# Patient Record
Sex: MALE | Race: WHITE | NOT HISPANIC OR LATINO | Employment: STUDENT | URBAN - METROPOLITAN AREA
[De-identification: names, ages, dates, MRNs, and addresses within clinical notes are randomized per-mention and may not be internally consistent; named-entity substitution may affect disease eponyms.]

---
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
## 2020-01-11 ENCOUNTER — LAB VISIT (OUTPATIENT)
Dept: LAB | Facility: HOSPITAL | Age: 20
End: 2020-01-11
Attending: INTERNAL MEDICINE
Payer: MEDICAID

## 2020-01-11 DIAGNOSIS — E53.8 VITAMIN B12 DEFICIENCY: ICD-10-CM

## 2020-01-11 DIAGNOSIS — E55.9 VITAMIN D DEFICIENCY DISEASE: ICD-10-CM

## 2020-01-11 DIAGNOSIS — E88.819 INSULIN RESISTANCE: ICD-10-CM

## 2020-01-11 LAB
25(OH)D3+25(OH)D2 SERPL-MCNC: 25 NG/ML (ref 30–96)
ALBUMIN SERPL BCP-MCNC: 3.2 G/DL (ref 3.5–5.2)
ALP SERPL-CCNC: 84 U/L (ref 55–135)
ALT SERPL W/O P-5'-P-CCNC: 19 U/L (ref 10–44)
ANION GAP SERPL CALC-SCNC: 8 MMOL/L (ref 8–16)
AST SERPL-CCNC: 18 U/L (ref 10–40)
BASOPHILS # BLD AUTO: 0.09 K/UL (ref 0–0.2)
BASOPHILS NFR BLD: 1.1 % (ref 0–1.9)
BILIRUB SERPL-MCNC: 0.5 MG/DL (ref 0.1–1)
BUN SERPL-MCNC: 16 MG/DL (ref 6–20)
CALCIUM SERPL-MCNC: 8.7 MG/DL (ref 8.7–10.5)
CHLORIDE SERPL-SCNC: 105 MMOL/L (ref 95–110)
CHOLEST SERPL-MCNC: 136 MG/DL (ref 120–199)
CHOLEST/HDLC SERPL: 3.2 {RATIO} (ref 2–5)
CO2 SERPL-SCNC: 26 MMOL/L (ref 23–29)
CREAT SERPL-MCNC: 0.8 MG/DL (ref 0.5–1.4)
DIFFERENTIAL METHOD: ABNORMAL
EOSINOPHIL # BLD AUTO: 0.1 K/UL (ref 0–0.5)
EOSINOPHIL NFR BLD: 0.8 % (ref 0–8)
ERYTHROCYTE [DISTWIDTH] IN BLOOD BY AUTOMATED COUNT: 16 % (ref 11.5–14.5)
EST. GFR  (AFRICAN AMERICAN): >60 ML/MIN/1.73 M^2
EST. GFR  (NON AFRICAN AMERICAN): >60 ML/MIN/1.73 M^2
ESTIMATED AVG GLUCOSE: 108 MG/DL (ref 68–131)
GLUCOSE SERPL-MCNC: 102 MG/DL (ref 70–110)
HBA1C MFR BLD HPLC: 5.4 % (ref 4–5.6)
HCT VFR BLD AUTO: 38.6 % (ref 40–54)
HDLC SERPL-MCNC: 42 MG/DL (ref 40–75)
HDLC SERPL: 30.9 % (ref 20–50)
HGB BLD-MCNC: 12.6 G/DL (ref 14–18)
LDLC SERPL CALC-MCNC: 83.8 MG/DL (ref 63–159)
LYMPHOCYTES # BLD AUTO: 1.6 K/UL (ref 1–4.8)
LYMPHOCYTES NFR BLD: 20.3 % (ref 18–48)
MCH RBC QN AUTO: 29.7 PG (ref 27–31)
MCHC RBC AUTO-ENTMCNC: 32.6 G/DL (ref 32–36)
MCV RBC AUTO: 91 FL (ref 82–98)
MONOCYTES # BLD AUTO: 0.7 K/UL (ref 0.3–1)
MONOCYTES NFR BLD: 8.9 % (ref 4–15)
NEUTROPHILS # BLD AUTO: 5.4 K/UL (ref 1.8–7.7)
NEUTROPHILS NFR BLD: 68.9 % (ref 38–73)
NONHDLC SERPL-MCNC: 94 MG/DL
PLATELET # BLD AUTO: 398 K/UL (ref 150–350)
PMV BLD AUTO: 9.7 FL (ref 9.2–12.9)
POTASSIUM SERPL-SCNC: 4.1 MMOL/L (ref 3.5–5.1)
PROT SERPL-MCNC: 7.5 G/DL (ref 6–8.4)
RBC # BLD AUTO: 4.24 M/UL (ref 4.6–6.2)
SODIUM SERPL-SCNC: 139 MMOL/L (ref 136–145)
TRIGL SERPL-MCNC: 51 MG/DL (ref 30–150)
TSH SERPL DL<=0.005 MIU/L-ACNC: 2.55 UIU/ML (ref 0.4–4)
VIT B12 SERPL-MCNC: 560 PG/ML (ref 210–950)
WBC # BLD AUTO: 7.85 K/UL (ref 3.9–12.7)

## 2020-01-11 PROCEDURE — 84443 ASSAY THYROID STIM HORMONE: CPT

## 2020-01-11 PROCEDURE — 83036 HEMOGLOBIN GLYCOSYLATED A1C: CPT

## 2020-01-11 PROCEDURE — 36415 COLL VENOUS BLD VENIPUNCTURE: CPT

## 2020-01-11 PROCEDURE — 80061 LIPID PANEL: CPT

## 2020-01-11 PROCEDURE — 80053 COMPREHEN METABOLIC PANEL: CPT

## 2020-01-11 PROCEDURE — 82306 VITAMIN D 25 HYDROXY: CPT

## 2020-01-11 PROCEDURE — 83525 ASSAY OF INSULIN: CPT

## 2020-01-11 PROCEDURE — 82607 VITAMIN B-12: CPT

## 2020-01-11 PROCEDURE — 85025 COMPLETE CBC W/AUTO DIFF WBC: CPT

## 2020-01-13 ENCOUNTER — PATIENT MESSAGE (OUTPATIENT)
Dept: INTERNAL MEDICINE | Facility: CLINIC | Age: 20
End: 2020-01-13

## 2020-01-13 DIAGNOSIS — F98.8 ATTENTION DEFICIT DISORDER, UNSPECIFIED HYPERACTIVITY PRESENCE: ICD-10-CM

## 2020-01-13 LAB
INSULIN COLLECTION INTERVAL: 2
INSULIN SERPL-ACNC: 16.4 UU/ML

## 2020-01-13 RX ORDER — METHYLPHENIDATE HYDROCHLORIDE 54 MG/1
TABLET ORAL
Qty: 30 TABLET | Refills: 0 | Status: SHIPPED | OUTPATIENT
Start: 2020-01-13

## 2020-01-13 RX ORDER — METHYLPHENIDATE HYDROCHLORIDE 27 MG/1
27 TABLET ORAL EVERY MORNING
Qty: 30 TABLET | Refills: 0 | Status: CANCELLED | OUTPATIENT
Start: 2020-01-13

## 2020-01-16 ENCOUNTER — PATIENT MESSAGE (OUTPATIENT)
Dept: INTERNAL MEDICINE | Facility: CLINIC | Age: 20
End: 2020-01-16

## 2020-01-16 ENCOUNTER — TELEPHONE (OUTPATIENT)
Dept: INTERNAL MEDICINE | Facility: CLINIC | Age: 20
End: 2020-01-16

## 2020-01-17 ENCOUNTER — TELEPHONE (OUTPATIENT)
Dept: INTERNAL MEDICINE | Facility: CLINIC | Age: 20
End: 2020-01-17

## 2020-01-17 NOTE — TELEPHONE ENCOUNTER
----- Message from Laura Huntley sent at 1/17/2020  9:47 AM CST -----  Contact: Nils(mother) [phone]  Pt mother is calling about picking up the 90L paperwork. Pt mother states she has a noon meeting and needs to  the paperwork before. Pt mother also states the paperwork can be emailed to her [email or web address]  Please call and advise.

## 2020-01-21 ENCOUNTER — OFFICE VISIT (OUTPATIENT)
Dept: INTERNAL MEDICINE | Facility: CLINIC | Age: 20
End: 2020-01-21
Payer: MEDICAID

## 2020-01-21 VITALS
DIASTOLIC BLOOD PRESSURE: 86 MMHG | HEART RATE: 122 BPM | BODY MASS INDEX: 54.23 KG/M2 | OXYGEN SATURATION: 99 % | SYSTOLIC BLOOD PRESSURE: 128 MMHG | HEIGHT: 60 IN | WEIGHT: 276.25 LBS | TEMPERATURE: 98 F

## 2020-01-21 DIAGNOSIS — Z00.00 ROUTINE GENERAL MEDICAL EXAMINATION AT A HEALTH CARE FACILITY: Primary | ICD-10-CM

## 2020-01-21 PROCEDURE — 99213 OFFICE O/P EST LOW 20 MIN: CPT | Mod: PBBFAC | Performed by: INTERNAL MEDICINE

## 2020-01-21 PROCEDURE — 99999 PR PBB SHADOW E&M-EST. PATIENT-LVL III: ICD-10-PCS | Mod: PBBFAC,,, | Performed by: INTERNAL MEDICINE

## 2020-01-21 PROCEDURE — 99395 PR PREVENTIVE VISIT,EST,18-39: ICD-10-PCS | Mod: S$PBB,,, | Performed by: INTERNAL MEDICINE

## 2020-01-21 PROCEDURE — 99999 PR PBB SHADOW E&M-EST. PATIENT-LVL III: CPT | Mod: PBBFAC,,, | Performed by: INTERNAL MEDICINE

## 2020-01-21 PROCEDURE — 99395 PREV VISIT EST AGE 18-39: CPT | Mod: S$PBB,,, | Performed by: INTERNAL MEDICINE

## 2020-01-21 NOTE — PROGRESS NOTES
"Chief Complaint: Follow up of mood disorder, adhd, morbid obesity, possible sleep apnea.      HPI:this is a 19 year old man who presents for follow up of above with his mother, Emely and father. All history is obtained from his mother and father due to his mental handicapped.    Since our last visit, he and his whole family got the flu.  He has gotten over the illness.  His parents have not been working on diet with him.  For the last several days, he has only been taking Concerta 54 mg daily (trying without the 27 mg dose).  His mother reports that his behavior and mood are the same without the 27 mg dose.   She states the only change is that he "eats constantly".  Upon further questioning, he will only eat certain foods he likes (salty chips). He will not eat random food out of the refrigerator or cabinet.  His mother reports that he focuses will with Concerta 54 mg daily - he will color, play X box, writes words in the dictonary.     She states he only has anxiety when it is raining.  He has lots of separation issues with his mother.  He sleeps well - goes to bed at 9 pm with his mother and gets up at 4 am.  He sleeps under a weighted blanket which helps his mood. He has been on Adderal which caused him to cry in the past.      He does not sleep unless his mother is next to him. If his mother goes out, he will wait for her to come home to go to bed.  She has not been out of town in 5 years. When she went out of town, her  slept next to him. He has separation issues from his mother. His mother states that he will not eat or drink until his mother comes home.  When his mother comes home, his mother states he acts as if he is starving. He can follow rules at school. He does not eat inappropriately at school. He refuses to eat home cooked meals. He will only eat [employer] fried chicken.       She notes that he breaths through is mouth at night. No snoring or stops breathing.  She notes that he is more tired. "  He only gets exercise at [facility]. He is obese with a BMI of 55.  Weight is higher by 4 pounds since our last visit.      He has anxiety during severe weather - he is crying and shakes.   His mother gives him Xanax 1 mg which helps him calm down. He does not take it very often.  He asks for the medication.     He has a ruptured left ear drum. The left ear drains. He has some hearing loss.  No hearing aids. He takes zyrtec 10 mg nightly. He will have gagging and vomiting from post nasal drip.     He takes vitamin B12 and vitamin D daily.           Past Medical History:   Diagnosis Date    ADHD (attention deficit hyperactivity disorder)      Down syndrome      HEARING LOSS       deaf until 7m.o. Ear doctor visit 3/2013    Jaundice       at birth    Obesity      Pneumonia       at the age of 2 yrs and again 11/2012    Strabismus      Vision abnormalities              Past Surgical History:   Procedure Laterality Date    ADENOIDECTOMY   2002    EXAMINATION UNDER ANESTHESIA Bilateral 1/4/2019     Procedure: Exam under anesthesia;  Surgeon: Harrison Robb MD;  Location: [address];  Service: ENT;  Laterality: Bilateral;    TONSILLECTOMY   2004    TYMPANOSTOMY TUBE PLACEMENT   2002&2004      Social History            Socioeconomic History    Marital status: Single       Spouse name: Not on file    Number of children: Not on file    Years of education: Not on file    Highest education level: Not on file   Occupational History    Not on file   Social Needs    Financial resource strain: Not on file    Food insecurity:       Worry: Not on file       Inability: Not on file    Transportation needs:       Medical: Not on file       Non-medical: Not on file   Tobacco Use    Smoking status: Passive Smoke Exposure - Never Smoker    Smokeless tobacco: Never Used    Tobacco comment: parents both smoke outside   Substance and Sexual Activity    Alcohol use: No    Drug use: No    Sexual activity:  "Not on file   Lifestyle    Physical activity:       Days per week: Not on file       Minutes per session: Not on file    Stress: Not on file   Relationships    Social connections:       Talks on phone: Not on file       Gets together: Not on file       Attends Muslim service: Not on file       Active member of club or organization: Not on file       Attends meetings of clubs or organizations: Not on file       Relationship status: Not on file   Other Topics Concern    Not on file   Social History Narrative     Lives with both parents and brother and sister     Attends [employer]         REVIEW OF SYSTEMS:No apparent fevers, chills, night sweats, fatigue, visual change,  sore throat, chest pain, shortness of breath, nausea, vomiting, constipation, diarrhea, dysuria, hematuria, polydipsia, polyuria, joint pain, muscle pain, headaches. Wears glasses.         Meds and allergies: updated on EPIC           Physical exam:  /86 (BP Location: Right arm, Patient Position: Sitting, BP Method: Large (Manual))   Pulse (!) 122   Temp 98.2 °F (36.8 °C) (Oral)   Ht 4' 11" (1.499 m)   Wt 125.3 kg (276 lb 3.8 oz)   SpO2 99%   BMI 55.79 kg/m²     General: alert, oriented x 3, no apparent distress.  Affect normal  HEENT: Conjunctivae: anicteric, PERRL, EOMI, TM clear, Oralpharynx clear  Neck: supple, no thyroid enlargement, no cervical lymphadenopathy  Resp: effort normal, lungs clear bilaterally  CV: Regular rate and rhythm without murmurs, gallops or rubs, no lower extremity edema,   Abdomen: soft, non-distended, non-tender, bowel sounds present, no hepatosplenomegaly.    Labs 1/11/20     Assessment/Plan:    Down's Syndrome with adhd, anxiety and separation issues from his mother- very long discussion with his mother regarding setting limits. He was very ugly and manipulative with his mother in the office. Suspect that he is having behavioral issues from his mother not setting limits.  Long " discussion regarding setting limits.  Parents do NOT want SSRI for anxiety. Continue Concerta 54 mg only once daily for now.   Set limits with food.     Morbid obesity - long discussion with parents regarding his weight - risk of sleep apnea, and death from morbid obesity.  Long discussion regarding diet and setting limits with his diet.     Chronic otitis externa- resolved     Mild anemia -labs were drawn right after he got the flu - will repeat at next visit    I will see him back in 3 weeks sooner if problems arise    Spent greater than 40 minutes with the patient and family , greater than 50% in face to face counseling.

## 2020-01-24 ENCOUNTER — PATIENT OUTREACH (OUTPATIENT)
Dept: ADMINISTRATIVE | Facility: HOSPITAL | Age: 20
End: 2020-01-24

## 2020-02-19 ENCOUNTER — PATIENT MESSAGE (OUTPATIENT)
Dept: INTERNAL MEDICINE | Facility: CLINIC | Age: 20
End: 2020-02-19

## 2020-02-19 DIAGNOSIS — F98.8 ATTENTION DEFICIT DISORDER, UNSPECIFIED HYPERACTIVITY PRESENCE: ICD-10-CM

## 2020-02-19 RX ORDER — METHYLPHENIDATE HYDROCHLORIDE 54 MG/1
TABLET ORAL
Qty: 30 TABLET | Refills: 0 | OUTPATIENT
Start: 2020-02-19

## 2020-02-19 NOTE — PROGRESS NOTES
Refill Routing Note     Medication(s) are not appropriate for processing by [facility]:    Medication Outside of Protocol    Appointments  past 12m or future 3m with PCP    Date Provider   Last Visit   1/21/2020 Italia Vinson MD   Next Visit   Visit date not found Italia Vinson MD           Automatic Epic Protocol Generated Data:    Requested Prescriptions   Pending Prescriptions Disp Refills    methylphenidate HCl (CONCERTA) 54 MG CR tablet 30 tablet 0     Sig: Take 1 tab in the am, with the 27 mg tablet.       ADD/ADHD Refill Protocol Passed - 2/19/2020  1:12 PM        Passed - Patient seen within 3 months     Last visit with Italia Vinson MD: 1/21/2020  Last visit in [facility]: 1/21/2020    Patient's next visit in [facility]: Visit date not found           Passed - Med not refilled within 4 weeks           Note composed:3:40 PM 02/19/2020

## 2020-02-20 ENCOUNTER — TELEPHONE (OUTPATIENT)
Dept: INTERNAL MEDICINE | Facility: CLINIC | Age: 20
End: 2020-02-20

## 2020-02-20 NOTE — TELEPHONE ENCOUNTER
Radha asked if they had any available appointments on tomorrow but I informed her that your schedule was pretty booked up.

## 2020-02-20 NOTE — TELEPHONE ENCOUNTER
I called and spoke with patient's mother, Radha and she stated that patient cannot come in on today since he's in school. Mother stated that patient takes his very last pill on today. Mother mentioned that patient has a scheduled appointment with Psych on 03/03.

## 2020-02-20 NOTE — TELEPHONE ENCOUNTER
Dr Vinson, looks like patient is on your schedule today for 1 pm for concerta medication refill. Just to inform.

## 2020-02-20 NOTE — TELEPHONE ENCOUNTER
Please contact mother  He needs to be seen for a refill of concerta.  DR PEREA can see Joao today for 1 pm?  Can he make this apt?

## 2020-02-21 ENCOUNTER — TELEPHONE (OUTPATIENT)
Dept: INTERNAL MEDICINE | Facility: CLINIC | Age: 20
End: 2020-02-21

## 2020-02-21 ENCOUNTER — PATIENT MESSAGE (OUTPATIENT)
Dept: INTERNAL MEDICINE | Facility: CLINIC | Age: 20
End: 2020-02-21

## 2020-02-21 NOTE — TELEPHONE ENCOUNTER
----- Message from Kelly Oliver sent at 2/21/2020 11:09 AM CST -----  Contact: Mother/Radha [phone]  Request to speak to you about the miss appointment    Thank you

## 2020-02-21 NOTE — TELEPHONE ENCOUNTER
Margarita,  You are seeing this pt on 3/3/20 to establish care for his adhd and mood issues.    Mother is very manipulative and are a large part of his problem.     Please see my note for details. He was on a high dose of concerta - 27 plus 54 mg.  He has a lot of anxiety.  I stopped the 27 mg dose.  I had been monitoring him closely until he could see you. He did not show for his apt on 2/7/20.  Parents have declined SSRI.     I have not refilled the concerta since he did not show for his apt. I offered him an apt yesterday -  Mother refused to bring him in. Now she is mad that he cannot get a prescription. She was lying to my MA on the phone about the interactions yesterday (all documented in Epic)    I have had long discussion with parents in clinic about setting limits and behavioral plan. Parents also need to improve diet.  Discussed risk of death from his morbid obesity (had a down's pt die about 5 years ago due to obesity hypoventilation and untreated sleep apnea).     You are going to need to set strict limits with the parents.     GOod luck

## 2020-02-21 NOTE — TELEPHONE ENCOUNTER
"Spoke with pt mother she states that's that she wasn't notified that she had an apt. She then stated that she was under the impression that PCP would just fill the medication when she called and requested the Rx. She is upset and states that it is not her issue that PCP will be out of town next week. Advised mother that PCP attempted to see pt on yesterday and she advised the office that " she wasn't pulling her son out of school for this apt". Advised mother that pt needs an apt, PCP can see him on 3-3 @ pm, offered mother to call Psych to see if the pt can be seen in Psych today or next week by someone. Pt mother called back and states that she didn't want to keep appt because the pt is seeing Psych the same day and time for the same issue.   "

## 2020-03-03 ENCOUNTER — TELEPHONE (OUTPATIENT)
Dept: INTERNAL MEDICINE | Facility: CLINIC | Age: 20
End: 2020-03-03

## 2020-03-03 NOTE — TELEPHONE ENCOUNTER
According to , a prescription was written by Dr Renny Gonzalez on 2/27/20.  Obviously his mother got someone else to write the concerta prescriptions.    He will likely not be establishing care with you.     Thanks for letting me know

## 2020-03-03 NOTE — TELEPHONE ENCOUNTER
----- Message from Margarita Quinones MD sent at 3/3/2020 10:37 AM CST -----  Regarding: No show  I just wanted to let you know that this patient did not show for his new patient appointment today.

## 2020-12-16 ENCOUNTER — OFFICE VISIT (OUTPATIENT)
Dept: URGENT CARE | Facility: CLINIC | Age: 20
End: 2020-12-16
Payer: MEDICAID

## 2020-12-16 VITALS
TEMPERATURE: 98 F | HEIGHT: 60 IN | RESPIRATION RATE: 20 BRPM | SYSTOLIC BLOOD PRESSURE: 117 MMHG | BODY MASS INDEX: 49.48 KG/M2 | DIASTOLIC BLOOD PRESSURE: 70 MMHG | OXYGEN SATURATION: 99 % | HEART RATE: 90 BPM | WEIGHT: 252 LBS

## 2020-12-16 DIAGNOSIS — U07.1 COVID-19 VIRUS DETECTED: ICD-10-CM

## 2020-12-16 DIAGNOSIS — U07.1 COVID-19 VIRUS INFECTION: Primary | ICD-10-CM

## 2020-12-16 LAB
CTP QC/QA: YES
SARS-COV-2 RDRP RESP QL NAA+PROBE: POSITIVE

## 2020-12-16 PROCEDURE — U0002: ICD-10-PCS | Mod: QW,S$GLB,, | Performed by: FAMILY MEDICINE

## 2020-12-16 PROCEDURE — U0002 COVID-19 LAB TEST NON-CDC: HCPCS | Mod: QW,S$GLB,, | Performed by: FAMILY MEDICINE

## 2020-12-16 PROCEDURE — 99213 PR OFFICE/OUTPT VISIT, EST, LEVL III, 20-29 MIN: ICD-10-PCS | Mod: S$GLB,CS,, | Performed by: FAMILY MEDICINE

## 2020-12-16 PROCEDURE — 99213 OFFICE O/P EST LOW 20 MIN: CPT | Mod: S$GLB,CS,, | Performed by: FAMILY MEDICINE

## 2020-12-16 NOTE — PROGRESS NOTES
"Subjective:       Patient ID: Joao Erickson is a 20 y.o. male.    Vitals:  height is 4' 9" (1.448 m) and weight is 114.3 kg (252 lb). His temperature is 97.6 °F (36.4 °C). His blood pressure is 117/70 and his pulse is 90. His respiration is 20 and oxygen saturation is 99%.     Chief Complaint: Headache    This is a 20 y.o. male who presents today with a chief complaint of   Headache, fever 101.3, congestion, cough. Sx started last night. Pt exposed on Friday     Headache   This is a new problem. The current episode started yesterday. The problem occurs constantly. The problem has been gradually worsening. The pain is located in the frontal region. The pain does not radiate. The pain quality is not similar to prior headaches. The quality of the pain is described as aching and throbbing. The pain is at a severity of 5/10. The pain is moderate. Associated symptoms include coughing and a fever. Pertinent negatives include no blurred vision, dizziness, ear pain, eye pain, eye redness, loss of balance, nausea, neck pain, photophobia, sinus pressure, sore throat, tinnitus or vomiting. The symptoms are aggravated by activity. He has tried acetaminophen (zyrtec) for the symptoms. The treatment provided no relief. There is no history of migraine headaches.       Constitution: Positive for chills, fatigue and fever. Negative for sweating.   HENT: Positive for congestion. Negative for ear pain, tinnitus, facial swelling, sinus pain, sinus pressure, sore throat and voice change.    Neck: Negative for neck pain, neck stiffness and painful lymph nodes.   Eyes: Negative for eye pain, eye redness, photophobia, vision loss, double vision and blurred vision.   Respiratory: Positive for cough. Negative for chest tightness, sputum production, bloody sputum, COPD, shortness of breath, stridor, wheezing and asthma.    Gastrointestinal: Negative for nausea, vomiting and diarrhea.   Musculoskeletal: Negative for trauma and muscle ache. "   Skin: Negative for rash, wound and lesion.   Allergic/Immunologic: Negative for seasonal allergies and asthma.   Neurological: Positive for headaches. Negative for dizziness, history of vertigo, light-headedness, facial drooping, speech difficulty, coordination disturbances, loss of balance, history of migraines, disorientation and loss of consciousness.   Hematologic/Lymphatic: Negative for swollen lymph nodes.   Psychiatric/Behavioral: Negative for disorientation, confusion, nervous/anxious, sleep disturbance and depression. The patient is not nervous/anxious.        Objective:      Physical Exam   Pulmonary/Chest: Effort normal. No respiratory distress.   Abdominal: Normal appearance.   Neurological: He is alert.   Nursing note and vitals reviewed.        Assessment:       1. COVID-19 virus infection        Plan:         COVID-19 virus infection  -     POCT COVID-19 Rapid Screening    discussed symptom monitoring, contact notifications and isolation X 10 days.

## 2020-12-16 NOTE — PATIENT INSTRUCTIONS
Instructions for Patients with Confirmed or Suspected COVID-19    If you are awaiting your test result, you will either be called or it will be released to the patient portal.  If you have any questions about your test, please visit [email or web address] or call our COVID-19 information line at [phone].      Instructions for non-hospitalized or discharged patients with confirmed or suspected COVID-19:       Stay home except to get medical care.    Separate yourself from other people and animals in your home.    Call ahead before visiting your doctor.    Wear a face mask.    Cover your coughs and sneezes.    Clean your hands often.    Avoid sharing personal household items.    Clean all high-touch surfaces every day.    Monitor your symptoms. Seek prompt medical attention if your illness is worsening (e.g., difficulty breathing). Before seeking care, call your healthcare provider.    If you have a medical emergency and must call 911, notify the dispatcher that you have or are being evaluated for COVID-19. If possible, put on a face mask before emergency medical services arrive.    Use the following symptom-based strategy to return to normal activity following a suspected or confirmed case of COVID-19. Continue isolation until:   o At least 3 days (72 hours) have passed since recovery defined as resolution of fever without the use of fever-reducing medications and improvement in respiratory symptoms (e.g. cough, shortness of breath), and   o At least 10 days have passed since the first positive test.       As one of the next steps, you will receive a call or text from the [place] Department of Health ([facility]) COVID-19 Tracing Team. See the contact information below so you know not to ignore the health departments call. It is important that you contact them back immediately so they can help.     Contact Tracer Number:  [phone]  Caller ID for most carriers: [place] Dept [facility]    What is  contact tracing?   Contact tracing is a process that helps identify everyone who has been in close contact with an infected person. Contact tracers let those people know they may have been exposed and guide them on next steps. Confidentiality is important for everyone; no one will be told who may have exposed them to the virus.   Your involvement is important. The more we know about where and how this virus is spreading, the better chance we have at stopping it from spreading further.  What does exposure mean?   Exposure means you have been within 6 feet for more than 15 minutes with a person who has or had COVID-19.  What kind of questions do the contact tracers ask?   A contact tracer will confirm your basic contact information including name, address, phone number, and next of kin, as well as asking about any symptoms you may have had. Theyll also ask you how you think you may have gotten sick, such as places where you may have been exposed to the virus, and people you were with. Those names will never be shared with anyone outside of that call, and will only be used to help trace and stop the spread of the virus.   I have privacy concerns. How will the state use my information?   Your privacy about your health is important. All calls are completed using call centers that use the appropriate health privacy protection measures (HIPAA compliance), meaning that your patient information is safe. No one will ever ask you any questions related to immigration status. Your health comes first.   Do I have to participate?   You do not have to participate, but we strongly encourage you to. Contact tracing can help us catch and control new outbreaks as theyre developing to keep your friends and family safe.   What if I dont hear from anyone?   If you dont receive a call within 24 hours, you can call the number above right away to inquire about your status. That line is open from 8:00 am - 8:00 p.m., 7 days a  week.  Contact tracing saves lives! Together, we have the power to beat this virus and keep our loved ones and neighbors safe.       Instructions for household members, intimate partners and caregivers in a non-healthcare setting of a patient with confirmed or suspected COVID-19:         Close contacts should monitor their health and call their healthcare provider right away if they develop symptoms suggestive of COVID-19 (e.g., fever, cough, shortness of breath).    Stay home except to get medical care. Separate yourself from other people and animals in the home.   Monitor the patients symptoms. If the patient is getting sicker, call his or her healthcare provider. If the patient has a medical emergency and you need to call 911, notify the dispatch personnel that the patient has or is being evaluated for COVID-19.    Wear a facemask when around other people such as sharing a room or vehicle and before entering a healthcare provider's office.   Cover coughs and sneezes with a tissue. Throw used tissues in a lined trash can immediately and wash hands.   Clean hands often with soap and water for at least 20 seconds or with an alcohol-based hand , rubbing hands together until they feel dry. Avoid touching your eyes, nose, and mouth with unwashed hands.   Clean all high-touch; surfaces every day, including counters, tabletops, doorknobs, bathroom fixtures, toilets, phones, keyboards, tablets, bedside tables, etc. Use a household cleaning spray or wipe according to label instructions.   Avoid sharing personal household items such as dishes, drinking glasses, cups, towels, bedding, etc. After these items are used, they should be washed thoroughly with soap and water.   Continue isolation until:   At least 3 days (72 hours) have passed since recovery defined as resolution of fever without the use of fever-reducing medications and improvement in respiratory symptoms (e.g. cough, shortness of breath),  and    At least 10 days have passed since the patients first positive test.    https://www.cdc.gov/coronavirus/2019-ncov/your-health/index.htm

## 2021-03-04 NOTE — TELEPHONE ENCOUNTER
----- Message from Yesenia Andre sent at 1/15/2019  1:11 PM CST -----  Contact: rama Garcia [phone]  Mom would like a call back. A prescription was sent on Concerta 54 mg & the pharmacy is telling mom they don't have the medication & have no idea when they will get it.   Samples Given: Cerave anti itch cream BID PRN flares. Detail Level: Zone

## 2021-10-16 ENCOUNTER — NURSE TRIAGE (OUTPATIENT)
Dept: ADMINISTRATIVE | Facility: CLINIC | Age: 21
End: 2021-10-16

## 2021-10-19 ENCOUNTER — OFFICE VISIT (OUTPATIENT)
Dept: INTERNAL MEDICINE | Facility: CLINIC | Age: 21
End: 2021-10-19
Payer: MEDICAID

## 2021-10-19 VITALS
HEIGHT: 60 IN | WEIGHT: 302 LBS | OXYGEN SATURATION: 98 % | DIASTOLIC BLOOD PRESSURE: 74 MMHG | SYSTOLIC BLOOD PRESSURE: 118 MMHG | HEART RATE: 118 BPM | RESPIRATION RATE: 18 BRPM | BODY MASS INDEX: 59.29 KG/M2

## 2021-10-19 DIAGNOSIS — H66.92 ACUTE EAR INFECTION, LEFT: ICD-10-CM

## 2021-10-19 DIAGNOSIS — H54.7 VISION LOSS: ICD-10-CM

## 2021-10-19 DIAGNOSIS — H66.90 EAR INFECTION: ICD-10-CM

## 2021-10-19 DIAGNOSIS — M25.559 HIP PAIN: ICD-10-CM

## 2021-10-19 DIAGNOSIS — F90.2 ATTENTION DEFICIT HYPERACTIVITY DISORDER (ADHD), COMBINED TYPE: ICD-10-CM

## 2021-10-19 DIAGNOSIS — Z13.9 SCREENING DUE: Primary | ICD-10-CM

## 2021-10-19 DIAGNOSIS — L30.4 INTERTRIGO: ICD-10-CM

## 2021-10-19 PROCEDURE — 99999 PR PBB SHADOW E&M-EST. PATIENT-LVL IV: ICD-10-PCS | Mod: PBBFAC,,, | Performed by: STUDENT IN AN ORGANIZED HEALTH CARE EDUCATION/TRAINING PROGRAM

## 2021-10-19 PROCEDURE — 99214 OFFICE O/P EST MOD 30 MIN: CPT | Mod: PBBFAC | Performed by: STUDENT IN AN ORGANIZED HEALTH CARE EDUCATION/TRAINING PROGRAM

## 2021-10-19 PROCEDURE — 90471 IMMUNIZATION ADMIN: CPT | Mod: PBBFAC

## 2021-10-19 PROCEDURE — 99213 OFFICE O/P EST LOW 20 MIN: CPT | Mod: S$PBB,,, | Performed by: STUDENT IN AN ORGANIZED HEALTH CARE EDUCATION/TRAINING PROGRAM

## 2021-10-19 PROCEDURE — 99999 PR PBB SHADOW E&M-EST. PATIENT-LVL IV: CPT | Mod: PBBFAC,,, | Performed by: STUDENT IN AN ORGANIZED HEALTH CARE EDUCATION/TRAINING PROGRAM

## 2021-10-19 PROCEDURE — 99213 PR OFFICE/OUTPT VISIT, EST, LEVL III, 20-29 MIN: ICD-10-PCS | Mod: S$PBB,,, | Performed by: STUDENT IN AN ORGANIZED HEALTH CARE EDUCATION/TRAINING PROGRAM

## 2021-10-19 RX ORDER — CIPROFLOXACIN AND DEXAMETHASONE 3; 1 MG/ML; MG/ML
4 SUSPENSION/ DROPS AURICULAR (OTIC) 2 TIMES DAILY
Qty: 7.5 ML | Refills: 0 | Status: SHIPPED | OUTPATIENT
Start: 2021-10-19

## 2022-04-13 ENCOUNTER — TELEPHONE (OUTPATIENT)
Dept: OTOLARYNGOLOGY | Facility: CLINIC | Age: 22
End: 2022-04-13
Payer: MEDICAID

## 2022-04-13 NOTE — TELEPHONE ENCOUNTER
----- Message from Mariah Cedeno sent at 4/13/2022  1:24 PM CDT -----  Patient Call Back    Who Called: Mrs. Erickson (mother)     What is the request in detail: Pt mother calling to speak with someone regarding the scheduling of an appointment. The pt mother stated that he failed his hearing test a month ago. Pls advise    Can the clinic reply by [facility]?    Best Call Back Number: [phone]

## 2022-04-19 ENCOUNTER — CLINICAL SUPPORT (OUTPATIENT)
Dept: AUDIOLOGY | Facility: CLINIC | Age: 22
End: 2022-04-19
Payer: MEDICAID

## 2022-04-19 ENCOUNTER — OFFICE VISIT (OUTPATIENT)
Dept: OTOLARYNGOLOGY | Facility: CLINIC | Age: 22
End: 2022-04-19
Payer: MEDICAID

## 2022-04-19 VITALS — WEIGHT: 302 LBS | BODY MASS INDEX: 58.99 KG/M2

## 2022-04-19 DIAGNOSIS — H90.3 SENSORINEURAL HEARING LOSS (SNHL) OF BOTH EARS: ICD-10-CM

## 2022-04-19 DIAGNOSIS — Q90.9 DOWN SYNDROME: Primary | ICD-10-CM

## 2022-04-19 DIAGNOSIS — E66.01 CLASS 3 SEVERE OBESITY WITH BODY MASS INDEX (BMI) OF 50.0 TO 59.9 IN ADULT, UNSPECIFIED OBESITY TYPE, UNSPECIFIED WHETHER SERIOUS COMORBIDITY PRESENT: ICD-10-CM

## 2022-04-19 DIAGNOSIS — H72.92 PERFORATED TYMPANIC MEMBRANE, LEFT: ICD-10-CM

## 2022-04-19 DIAGNOSIS — H90.3 SENSORINEURAL HEARING LOSS, BILATERAL: Primary | ICD-10-CM

## 2022-04-19 PROCEDURE — 3008F PR BODY MASS INDEX (BMI) DOCUMENTED: ICD-10-PCS | Mod: CPTII,,, | Performed by: OTOLARYNGOLOGY

## 2022-04-19 PROCEDURE — 92553 AUDIOMETRY AIR & BONE: CPT | Mod: PBBFAC | Performed by: AUDIOLOGIST

## 2022-04-19 PROCEDURE — 1159F MED LIST DOCD IN RCRD: CPT | Mod: CPTII,,, | Performed by: OTOLARYNGOLOGY

## 2022-04-19 PROCEDURE — 92567 TYMPANOMETRY: CPT | Mod: PBBFAC | Performed by: AUDIOLOGIST

## 2022-04-19 PROCEDURE — 99999 PR PBB SHADOW E&M-EST. PATIENT-LVL II: ICD-10-PCS | Mod: PBBFAC,,, | Performed by: OTOLARYNGOLOGY

## 2022-04-19 PROCEDURE — 99212 OFFICE O/P EST SF 10 MIN: CPT | Mod: PBBFAC | Performed by: OTOLARYNGOLOGY

## 2022-04-19 PROCEDURE — 3008F BODY MASS INDEX DOCD: CPT | Mod: CPTII,,, | Performed by: OTOLARYNGOLOGY

## 2022-04-19 PROCEDURE — 1159F PR MEDICATION LIST DOCUMENTED IN MEDICAL RECORD: ICD-10-PCS | Mod: CPTII,,, | Performed by: OTOLARYNGOLOGY

## 2022-04-19 PROCEDURE — 99214 OFFICE O/P EST MOD 30 MIN: CPT | Mod: S$PBB,,, | Performed by: OTOLARYNGOLOGY

## 2022-04-19 PROCEDURE — 99999 PR PBB SHADOW E&M-EST. PATIENT-LVL II: CPT | Mod: PBBFAC,,, | Performed by: OTOLARYNGOLOGY

## 2022-04-19 PROCEDURE — 99214 PR OFFICE/OUTPT VISIT, EST, LEVL IV, 30-39 MIN: ICD-10-PCS | Mod: S$PBB,,, | Performed by: OTOLARYNGOLOGY

## 2022-04-19 PROCEDURE — 92555 SPEECH THRESHOLD AUDIOMETRY: CPT | Mod: PBBFAC | Performed by: AUDIOLOGIST

## 2022-04-19 NOTE — PROGRESS NOTES
Subjective:       Patient ID: Joao Erickson is a 21 y.o. male.    Chief Complaint: Failed hearing test    HPI      The pt is a 21 y.o. male who failed a recent hearing test. The abnormality was noted in both sides ; mom unsure. The test was conducted at school. The test was done 6 months ago. The parents were unaware of a possible hearing loss. The level of the hearing loss noted on the test is mild.  The parents note the following associated signs and symptoms: none.    The patient has a prior history of ear infections. The patient has a prior history of PE Tubes x 2; has perf AS. The patient has not been treated for this problem prior to this consultation.    Last seen 11/7/2019. Mom feels no HL noted.       Review of Systems   Constitutional: Negative for chills, fever and unexpected weight change.        Down  obese   HENT: Negative for facial swelling and hearing loss.         PMH perf AS     BMT x2  TA   Eyes: Negative for visual disturbance.   Respiratory: Negative for wheezing and stridor.    Cardiovascular:        Neg for CHD   Gastrointestinal: Negative.  Negative for nausea and vomiting.   Genitourinary: Negative.         Neg for congenital abn   Musculoskeletal: Negative for arthralgias and myalgias.   Skin: Negative.    Neurological: Positive for speech difficulty. Negative for seizures and weakness.        GDD  Down   Hematological: Negative for adenopathy. Does not bruise/bleed easily.   Psychiatric/Behavioral: Positive for behavioral problems.       Objective:      Physical Exam  Constitutional:       Comments: Down    DD  Morbid obesity   HENT:      Head: Normocephalic.      Right Ear: External ear normal. No middle ear effusion (TM /MEnl ). Tympanic membrane is scarred.      Left Ear: External ear normal. No drainage. Left ear swelling:    No middle ear effusion (  ). Tympanic membrane is perforated.      Ears:        Nose: Nose normal. No nasal deformity.   Eyes:      General: Lids are normal.       Conjunctiva/sclera: Conjunctivae normal.      Pupils: Pupils are equal, round, and reactive to light.   Neck:      Thyroid: No thyroid mass.      Trachea: Trachea normal.   Cardiovascular:      Rate and Rhythm: Normal rate and regular rhythm.   Pulmonary:      Effort: Pulmonary effort is normal. No respiratory distress.   Musculoskeletal:         General: Normal range of motion.   Lymphadenopathy:      Cervical: No cervical adenopathy.   Skin:     General: Skin is warm.      Findings: No rash.   Neurological:      Mental Status: He is alert.      Cranial Nerves: No cranial nerve deficit.      Comments: Down/DD   Psychiatric:         Speech: Speech is delayed.         Behavior: Behavior is slowed.           Cerumen removal: Ears cleared under direct vision with Q tip. Child appropriately restrained by parent or/and papoose board.                   Assessment:       1. Down syndrome    2. Sensorineural hearing loss (SNHL) of both ears    3. Perforated tympanic membrane, left    4. Class 3 severe obesity with body mass index (BMI) of 50.0 to 59.9 in adult, unspecified obesity type, unspecified whether serious comorbidity present        Plan:       1. Reassure    2.Aid eval NOSH     3 RTC 6 mos

## 2022-04-19 NOTE — PROGRESS NOTES
Joao Erickson was seen today in the clinic for an audiologic evaluation.  Patient's mother reported that Joao failed a school screening.  Joao has a history of cerumen build up and a perforation in his left ear.     Tympanometry revealed Type As in the right ear and Type B (large ECV) in the left ear.     Audiogram results revealed a mild to severe sensorineural hearing loss (SNHL) in the right ear and a mild to moderate primarily SNHL in the left ear.      Speech reception thresholds were noted at 30 dB in the right ear and 30 dB in the left ear.    Speech discrimination scores were unable to be scored accurately due to poor articulation.    Recommendations:  1. Otologic evaluation  2. Annual audiogram  3. Hearing protection when in noise  4. Hearing aid consultation upon medical clearance

## 2023-09-01 ENCOUNTER — TELEPHONE (OUTPATIENT)
Dept: PRIMARY CARE CLINIC | Facility: CLINIC | Age: 23
End: 2023-09-01
Payer: MEDICAID

## 2023-09-01 NOTE — TELEPHONE ENCOUNTER
----- Message from Doug Crespo sent at 8/31/2023  7:20 PM CDT -----  Type:  Patient Returning Call    Who Called:pt  Who Left Message for Patient:  Does the patient know what this is regarding?:appt  Would the patient rather a call back or a response via [employer]? Call  Best Call Back Number:[phone]  Additional Information: pt states he would like to be seen with provider if available.

## 2025-08-27 ENCOUNTER — TELEPHONE (OUTPATIENT)
Dept: OTOLARYNGOLOGY | Facility: CLINIC | Age: 25
End: 2025-08-27
Payer: COMMERCIAL

## 2025-09-04 ENCOUNTER — OFFICE VISIT (OUTPATIENT)
Dept: OTOLARYNGOLOGY | Facility: CLINIC | Age: 25
End: 2025-09-04
Payer: COMMERCIAL

## 2025-09-04 ENCOUNTER — CLINICAL SUPPORT (OUTPATIENT)
Dept: AUDIOLOGY | Facility: CLINIC | Age: 25
End: 2025-09-04
Payer: COMMERCIAL

## 2025-09-04 VITALS — BODY MASS INDEX: 53.39 KG/M2 | WEIGHT: 273.38 LBS

## 2025-09-04 DIAGNOSIS — H72.92 PERFORATED TYMPANIC MEMBRANE, LEFT: ICD-10-CM

## 2025-09-04 DIAGNOSIS — H90.3 SENSORINEURAL HEARING LOSS (SNHL) OF BOTH EARS: ICD-10-CM

## 2025-09-04 DIAGNOSIS — H61.23 BILATERAL IMPACTED CERUMEN: ICD-10-CM

## 2025-09-04 DIAGNOSIS — E66.813 CLASS 3 SEVERE OBESITY WITH BODY MASS INDEX (BMI) OF 50.0 TO 59.9 IN ADULT, UNSPECIFIED OBESITY TYPE, UNSPECIFIED WHETHER SERIOUS COMORBIDITY PRESENT: ICD-10-CM

## 2025-09-04 DIAGNOSIS — Q90.9 DOWN SYNDROME: Primary | ICD-10-CM

## 2025-09-04 DIAGNOSIS — H69.93 DYSFUNCTION OF BOTH EUSTACHIAN TUBES: Primary | ICD-10-CM

## 2025-09-04 DIAGNOSIS — Z01.118 ENCOUNTER FOR EXAMINATION OF EARS AND HEARING WITH OTHER ABNORMAL FINDINGS: ICD-10-CM

## 2025-09-04 DIAGNOSIS — R21 RASH: ICD-10-CM

## 2025-09-04 DIAGNOSIS — H90.6 MIXED CONDUCTIVE AND SENSORINEURAL HEARING LOSS OF BOTH EARS: ICD-10-CM

## 2025-09-04 PROCEDURE — 3008F BODY MASS INDEX DOCD: CPT | Mod: CPTII,S$GLB,, | Performed by: OTOLARYNGOLOGY

## 2025-09-04 PROCEDURE — 99204 OFFICE O/P NEW MOD 45 MIN: CPT | Mod: S$GLB,,, | Performed by: OTOLARYNGOLOGY

## 2025-09-04 PROCEDURE — 1159F MED LIST DOCD IN RCRD: CPT | Mod: CPTII,S$GLB,, | Performed by: OTOLARYNGOLOGY

## 2025-09-04 PROCEDURE — 99999 PR PBB SHADOW E&M-EST. PATIENT-LVL I: CPT | Mod: PBBFAC,,, | Performed by: AUDIOLOGIST

## 2025-09-04 PROCEDURE — 99999 PR PBB SHADOW E&M-EST. PATIENT-LVL III: CPT | Mod: PBBFAC,,, | Performed by: OTOLARYNGOLOGY

## 2025-09-04 PROCEDURE — 92567 TYMPANOMETRY: CPT | Mod: S$GLB,,, | Performed by: AUDIOLOGIST

## 2025-09-04 PROCEDURE — 92557 COMPREHENSIVE HEARING TEST: CPT | Mod: S$GLB,,, | Performed by: AUDIOLOGIST

## 2025-09-04 RX ORDER — LISDEXAMFETAMINE DIMESYLATE 50 MG/1
50 CAPSULE ORAL EVERY MORNING
COMMUNITY